# Patient Record
(demographics unavailable — no encounter records)

---

## 2024-12-02 NOTE — DISCUSSION/SUMMARY
[de-identified] : General Dx discussion The patient was advised of the diagnosis. The natural history of the pathology was explained in full to the patient in layman's terms. All questions were answered. The risks and benefits of surgical and non-surgical treatment alternatives were explained in full to the patient.   Case discussed. rt knee doing well  will get a mri lt knee to eval for a tear  will get a CSI lt knee f/u after mri

## 2024-12-02 NOTE — PHYSICAL EXAM
[Right] : right knee [Negative] : negative Suleiman's [Left] : left knee [5___] : hamstring 5[unfilled]/5 [Positive] : positive Rayne [] : patient ambulates without assistive device [TWNoteComboBox7] : flexion 105 degrees [de-identified] : extension 5 degrees

## 2024-12-02 NOTE — HISTORY OF PRESENT ILLNESS
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

## 2024-12-17 NOTE — DATA REVIEWED
[MRI] : MRI [Left] : left [Knee] : knee [Report was reviewed and noted in the chart] : The report was reviewed and noted in the chart [I independently reviewed and interpreted images and report] : I independently reviewed and interpreted images and report [I reviewed the films/CD] : I reviewed the films/CD

## 2024-12-17 NOTE — HISTORY OF PRESENT ILLNESS
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

## 2024-12-17 NOTE — DISCUSSION/SUMMARY
[Surgical risks reviewed] : Surgical risks reviewed [de-identified] : General Dx Discussion The patient was advised of the diagnosis. The natural history of the pathology was explained in full to the patient in layman's terms. All questions were answered. The risks and benefits of surgical and non-surgical treatment alternatives were explained in full to the patient.  Case Discussed. MRI reviewed, has occ. catching with pain and loss of motion  Arthroscopy with partial medial / lateral; meniscectomy and debridement / synovectomy discussed Risks, benefits and alternatives discussed. Risks include, but are not limited to infection, blood clot, nerve damage, recurrent tear, loss of motion, continued pain, worsened pain, need for another surgery in the future. Discussed that the surgery will not address any pain that he has from any OA he may have. He understands he will not be 100% better after surgery. Questions answered. He expressed understanding and would like to proceed.   The patient has two pathologic conditions at this point.  There is a meniscus tear which is causing symptomology and there is also underlying osteoarthritis.  The patient was counseled that surgical intervention to address the torn meniscus will not change the symptomology attributable to the arthritis.  The patient was advised that the pain attributable to the meniscus tear should be resolved arthroscopically.  However, the patient should expect to have continued aches and pains related to the arthritis, in the form of, but not limited to pain, weather related changes, dull ache, crepitation, limitation of motion and strength and the need for further surgeries. The patient understands that the arthroscopic procedure addresses the meniscus only and that after surgery, the knee will not be 100% but it should be improved with respect to the symptomology attributed to the torn meniscus.  Patient also is aware that further treatment after arthroscopy may be necessary in the form of oral medications, cortisone and/or viscosupplementation injections, and further surgeries including knee replacement.  The patient agrees, understands and wishes to proceed. full weight-bearing

## 2024-12-17 NOTE — PHYSICAL EXAM
[Right] : right knee [Negative] : negative Suleiman's [Left] : left knee [5___] : hamstring 5[unfilled]/5 [Positive] : positive Rayne [] : no pain with varus stress [TWNoteComboBox7] : flexion 100 degrees [de-identified] : extension 5 degrees

## 2024-12-17 NOTE — HISTORY OF PRESENT ILLNESS
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

## 2024-12-17 NOTE — PHYSICAL EXAM
[Right] : right knee [Negative] : negative Suleiman's [Left] : left knee [5___] : hamstring 5[unfilled]/5 [Positive] : positive Rayne [] : no pain with varus stress [TWNoteComboBox7] : flexion 100 degrees [de-identified] : extension 5 degrees

## 2024-12-17 NOTE — DISCUSSION/SUMMARY
[Surgical risks reviewed] : Surgical risks reviewed [de-identified] : General Dx Discussion The patient was advised of the diagnosis. The natural history of the pathology was explained in full to the patient in layman's terms. All questions were answered. The risks and benefits of surgical and non-surgical treatment alternatives were explained in full to the patient.  Case Discussed. MRI reviewed, has occ. catching with pain and loss of motion  Arthroscopy with partial medial / lateral; meniscectomy and debridement / synovectomy discussed Risks, benefits and alternatives discussed. Risks include, but are not limited to infection, blood clot, nerve damage, recurrent tear, loss of motion, continued pain, worsened pain, need for another surgery in the future. Discussed that the surgery will not address any pain that he has from any OA he may have. He understands he will not be 100% better after surgery. Questions answered. He expressed understanding and would like to proceed.   The patient has two pathologic conditions at this point.  There is a meniscus tear which is causing symptomology and there is also underlying osteoarthritis.  The patient was counseled that surgical intervention to address the torn meniscus will not change the symptomology attributable to the arthritis.  The patient was advised that the pain attributable to the meniscus tear should be resolved arthroscopically.  However, the patient should expect to have continued aches and pains related to the arthritis, in the form of, but not limited to pain, weather related changes, dull ache, crepitation, limitation of motion and strength and the need for further surgeries. The patient understands that the arthroscopic procedure addresses the meniscus only and that after surgery, the knee will not be 100% but it should be improved with respect to the symptomology attributed to the torn meniscus.  Patient also is aware that further treatment after arthroscopy may be necessary in the form of oral medications, cortisone and/or viscosupplementation injections, and further surgeries including knee replacement.  The patient agrees, understands and wishes to proceed.

## 2025-01-30 NOTE — PHYSICAL EXAM
[5___] : hamstring 5[unfilled]/5 [Left] : left knee [Right] : right knee [] : no pain with varus stress [TWNoteComboBox7] : flexion 120 degrees [de-identified] : extension 0 degrees

## 2025-01-30 NOTE — DISCUSSION/SUMMARY
[Surgical risks reviewed] : Surgical risks reviewed [de-identified] : General Dx Discussion The patient was advised of the diagnosis. The natural history of the pathology was explained in full to the patient in layman's terms. All questions were answered. The risks and benefits of surgical and non-surgical treatment alternatives were explained in full to the patient.  Case discussed. He will be sent for a course of PT. Follow up in 1 month.   Entered by BILL Huerta acting as scribe. - The documentation recorded by the scribe accurately reflects the service I personally performed and the decisions made by me.

## 2025-03-03 NOTE — DISCUSSION/SUMMARY
[Surgical risks reviewed] : Surgical risks reviewed [de-identified] : General Dx Discussion The patient was advised of the diagnosis. The natural history of the pathology was explained in full to the patient in layman's terms. All questions were answered. The risks and benefits of surgical and non-surgical treatment alternatives were explained in full to the patient.  Case discussed. Doing well.  Continue PT and HEP. f/u 6 weeks.    Entered by BILL Laird acting as scribe. - The documentation recorded by the scribe accurately reflects the service I personally performed and the decisions made by me.

## 2025-03-03 NOTE — HISTORY OF PRESENT ILLNESS
[3] : 3 [0] : 0 [Dull/Aching] : dull/aching [Occasional] : occasional [Full time] : Work status: full time [de-identified] : Here for f/u left knee s/p arthroscopy on 1/22/25. He is going to PT and doing better.  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

## 2025-03-03 NOTE — PHYSICAL EXAM
[Left] : left knee [Right] : right knee [5___] : hamstring 5[unfilled]/5 [] : no pain with varus stress [TWNoteComboBox7] : flexion 120 degrees [de-identified] : extension 0 degrees

## 2025-07-24 NOTE — HISTORY OF PRESENT ILLNESS
[FreeTextEntry1] : 68M presents to establish care Sent in by: a friend PMD:   pt feeling well. denies CP, SOB a rest or on exertion. Denies palpitations, dizziness, diaphoresis, syncope edema, orthopnea. 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 no vape/etoh/drugs Meds: omeprazole prn  Allergies: augmentin amoxicillin

## 2025-07-24 NOTE — DISCUSSION/SUMMARY
[FreeTextEntry1] : 68M presents to establish care  Feeling well Euvolemic EKG showing SR No cp or sob  abnormal ekg -will check tte to ro structural heart dz -no prev cv testing, will check carotid duplex given age >65  f/u after initial testing 50 min spent on complete encounter    [EKG obtained to assist in diagnosis and management of assessed problem(s)] : EKG obtained to assist in diagnosis and management of assessed problem(s)